# Patient Record
Sex: FEMALE | Race: WHITE | NOT HISPANIC OR LATINO | Employment: UNEMPLOYED | URBAN - METROPOLITAN AREA
[De-identification: names, ages, dates, MRNs, and addresses within clinical notes are randomized per-mention and may not be internally consistent; named-entity substitution may affect disease eponyms.]

---

## 2017-06-04 PROBLEM — D64.9 ANEMIA OF ACUTE INFECTION: Status: ACTIVE | Noted: 2017-06-04

## 2017-06-04 PROBLEM — J18.9 PNEUMONIA OF RIGHT LOWER LOBE DUE TO INFECTIOUS ORGANISM: Status: ACTIVE | Noted: 2017-06-04

## 2017-06-04 PROBLEM — F17.200 TOBACCO USE DISORDER: Status: ACTIVE | Noted: 2017-06-04

## 2017-06-04 PROBLEM — F41.9 ANXIETY: Status: ACTIVE | Noted: 2017-06-04

## 2017-06-04 PROBLEM — R93.89 ABNORMAL CT OF THE CHEST: Status: ACTIVE | Noted: 2017-06-04

## 2017-06-04 PROBLEM — R00.1 BRADYCARDIA: Status: ACTIVE | Noted: 2017-06-04

## 2017-06-04 PROBLEM — I10 ESSENTIAL (PRIMARY) HYPERTENSION: Status: ACTIVE | Noted: 2017-06-04

## 2017-06-04 PROBLEM — J20.9 ACUTE BRONCHITIS: Status: ACTIVE | Noted: 2017-06-04

## 2018-07-17 ENCOUNTER — ANESTHESIA EVENT (OUTPATIENT)
Dept: SURGERY | Facility: HOSPITAL | Age: 53
End: 2018-07-17
Payer: MEDICAID

## 2018-07-17 ENCOUNTER — OFFICE VISIT (OUTPATIENT)
Dept: ORTHOPEDICS | Facility: CLINIC | Age: 53
End: 2018-07-17
Payer: MEDICAID

## 2018-07-17 VITALS
SYSTOLIC BLOOD PRESSURE: 110 MMHG | BODY MASS INDEX: 21.34 KG/M2 | DIASTOLIC BLOOD PRESSURE: 79 MMHG | HEIGHT: 64 IN | WEIGHT: 125 LBS | HEART RATE: 82 BPM

## 2018-07-17 DIAGNOSIS — F17.210 CIGARETTE NICOTINE DEPENDENCE WITHOUT COMPLICATION: ICD-10-CM

## 2018-07-17 DIAGNOSIS — S52.501A CLOSED FRACTURE OF DISTAL END OF RIGHT RADIUS, UNSPECIFIED FRACTURE MORPHOLOGY, INITIAL ENCOUNTER: ICD-10-CM

## 2018-07-17 PROBLEM — F17.200 TOBACCO USE DISORDER: Status: RESOLVED | Noted: 2017-06-04 | Resolved: 2018-07-17

## 2018-07-17 PROCEDURE — 99204 OFFICE O/P NEW MOD 45 MIN: CPT | Mod: S$PBB,,, | Performed by: ORTHOPAEDIC SURGERY

## 2018-07-17 PROCEDURE — 99999 PR PBB SHADOW E&M-NEW PATIENT-LVL III: CPT | Mod: PBBFAC,,, | Performed by: ORTHOPAEDIC SURGERY

## 2018-07-17 PROCEDURE — 99203 OFFICE O/P NEW LOW 30 MIN: CPT | Mod: PBBFAC,PN | Performed by: ORTHOPAEDIC SURGERY

## 2018-07-17 RX ORDER — OXYCODONE AND ACETAMINOPHEN 10; 325 MG/1; MG/1
1 TABLET ORAL EVERY 4 HOURS PRN
Qty: 6 TABLET | Refills: 0 | Status: ON HOLD | OUTPATIENT
Start: 2018-07-17 | End: 2018-07-18 | Stop reason: HOSPADM

## 2018-07-17 RX ORDER — RIFAXIMIN 550 MG/1
550 TABLET ORAL 2 TIMES DAILY
Refills: 0 | COMMUNITY
Start: 2018-06-06 | End: 2020-08-06

## 2018-07-17 RX ORDER — MUPIROCIN 20 MG/G
OINTMENT TOPICAL
Status: CANCELLED | OUTPATIENT
Start: 2018-07-17

## 2018-07-17 RX ORDER — SODIUM CHLORIDE 9 MG/ML
INJECTION, SOLUTION INTRAVENOUS CONTINUOUS
Status: CANCELLED | OUTPATIENT
Start: 2018-07-17

## 2018-07-17 NOTE — LETTER
July 17, 2018      Maxine Pierce MD  [address] [facility] [address]           [facility]  [address] [facility] [address] [place] [address]  Phone: [phone]          Patient: Becki Flood   MR Number: [id number]   YOB: 1965   Date of Visit: 7/17/2018       Dear Dr. Maxine Pierce:    Thank you for referring Becki Flood to me for evaluation. Attached you will find relevant portions of my assessment and plan of care.    If you have questions, please do not hesitate to call me. I look forward to following Becki Flood along with you.    Sincerely,    Asaf Ritchie MD    Enclosure  CC:  No Recipients    If you would like to receive this communication electronically, please contact [email or web address] or [phone] to request more information on [employer] access.    For providers and/or their staff who would like to refer a patient to [facility], please contact us through our one-stop-shop provider referral line, Nicola Coronel, at [phone].    If you feel you have received this communication in error or would no longer like to receive these types of communications, please e-mail [email or web address]

## 2018-07-17 NOTE — PROGRESS NOTES
"HPI: Becki Flood is a 52 y.o. female who complains of right wrist pain. She injured the wrist when slipped and fell yesterday. Pt was transferred from [place] for Orthopedicconsult for distal radius fracture. Reports right wrist pain as sharp/aching, rated 7/10 at this time. Denies numbness, weakness. Closed reduction and splinting was performed in the ER.     Social History     Occupational History    Not on file.     Social History Main Topics    Smoking status: Current Every Day Smoker     Packs/day: 0.50     Years: 10.00    Smokeless tobacco: Never Used    Alcohol use No    Drug use: No    Sexual activity: Yes     Partners: Male        PAST MEDICAL/SURGICAL/FAMILY/SOCIAL/ HISTORY: REVIEWED    ALLERGIES/MEDICATIONS: REVIEWED       Review of Systems:     Constitution: Negative.   HEENT: Negative.   Eyes: Negative.   Cardiovascular: Negative.   Respiratory: Negative.   Endocrine: Negative.   Hematologic/Lymphatic: Negative.   Skin: Negative.   Musculoskeletal: Positive for right wrist pain.   Gastrointestinal: Negative.   Genitourinary: Negative.   Neurological: Negative.   Psychiatric/Behavioral: Negative.   Allergic/Immunologic: Negative.       PHYSICAL EXAM:  Vitals:    07/17/18 1153   BP: 110/79   Pulse: 82     Ht Readings from Last 1 Encounters:   07/17/18 5' 4" (1.626 m)     Wt Readings from Last 1 Encounters:   07/17/18 56.7 kg (125 lb)       GENERAL: Well developed, well nourished, no acute distress.  SKIN: Skin is intact. No atrophy, abrasions or lesions are noted.   Neurological: Normal mental status. Appropriate and conversant. Alert and oriented x 3.      Right upper extremity:  2+ radial pulse.  Capillary refill < 3 seconds.  Limited exam due to pain and splint. Splint is Clean, dry, intact.  Sensation to light touch intact radial, ulnar, median nerves.  Moderate swelling of the fingers. She is able to move the fingers. EPL is intact.  No lymphadenopathy, no masses or tumors palpated.  "     XRAYS:   3 views of right wrist pre and post reduction  reviewed today reveal comminuted distal radius fracture with some improvement in alignment post reduction.       ASSESSMENT:         Encounter Diagnoses   Name Primary?    Cigarette nicotine dependence without complication     Closed fracture of distal end of right radius, unspecified fracture morphology, initial encounter       PLAN:  I spent 15 minutes in consulation with the patient today. More than half the time was spent counseling the patient on her condition and the options for operative versus non-operative care.  Plan for ORIF versus ex fix right distal radius fracture. I have explained the procedure, including indications, risks, and alternatives.  Becki Remigio gave informed consent and is medically ready for surgery. TO OR tomorrow 7/18/18.

## 2018-07-18 ENCOUNTER — ANESTHESIA (OUTPATIENT)
Dept: SURGERY | Facility: HOSPITAL | Age: 53
End: 2018-07-18
Payer: MEDICAID

## 2018-07-18 ENCOUNTER — HOSPITAL ENCOUNTER (OUTPATIENT)
Facility: HOSPITAL | Age: 53
Discharge: HOME | End: 2018-07-18
Attending: ORTHOPAEDIC SURGERY | Admitting: ORTHOPAEDIC SURGERY
Payer: MEDICAID

## 2018-07-18 ENCOUNTER — HOSPITAL ENCOUNTER (OUTPATIENT)
Dept: RADIOLOGY | Facility: HOSPITAL | Age: 53
Discharge: HOME | End: 2018-07-18
Attending: ORTHOPAEDIC SURGERY | Admitting: ORTHOPAEDIC SURGERY
Payer: MEDICAID

## 2018-07-18 VITALS
BODY MASS INDEX: 20.49 KG/M2 | DIASTOLIC BLOOD PRESSURE: 72 MMHG | OXYGEN SATURATION: 96 % | WEIGHT: 120 LBS | RESPIRATION RATE: 16 BRPM | HEIGHT: 64 IN | HEART RATE: 78 BPM | TEMPERATURE: 98 F | SYSTOLIC BLOOD PRESSURE: 160 MMHG

## 2018-07-18 DIAGNOSIS — F17.210 CIGARETTE NICOTINE DEPENDENCE WITHOUT COMPLICATION: ICD-10-CM

## 2018-07-18 DIAGNOSIS — S62.109A WRIST FRACTURE: ICD-10-CM

## 2018-07-18 DIAGNOSIS — S52.501A CLOSED FRACTURE OF DISTAL END OF RIGHT RADIUS, UNSPECIFIED FRACTURE MORPHOLOGY, INITIAL ENCOUNTER: Primary | ICD-10-CM

## 2018-07-18 PROCEDURE — 25000003 PHARM REV CODE 250: Mod: PO | Performed by: ORTHOPAEDIC SURGERY

## 2018-07-18 PROCEDURE — 37000009 HC ANESTHESIA EA ADD 15 MINS: Mod: PO | Performed by: ORTHOPAEDIC SURGERY

## 2018-07-18 PROCEDURE — 63600175 PHARM REV CODE 636 W HCPCS: Mod: PO | Performed by: ORTHOPAEDIC SURGERY

## 2018-07-18 PROCEDURE — D9220A PRA ANESTHESIA: Mod: ANES,,, | Performed by: ANESTHESIOLOGY

## 2018-07-18 PROCEDURE — D9220A PRA ANESTHESIA: Mod: CRNA,,, | Performed by: NURSE ANESTHETIST, CERTIFIED REGISTERED

## 2018-07-18 PROCEDURE — 63600175 PHARM REV CODE 636 W HCPCS: Mod: PO | Performed by: NURSE ANESTHETIST, CERTIFIED REGISTERED

## 2018-07-18 PROCEDURE — 25607 OPTX DST RD XARTC FX/EPI SEP: CPT | Mod: RT,,, | Performed by: ORTHOPAEDIC SURGERY

## 2018-07-18 PROCEDURE — 01830 ANES ARTHR/NDSC WRST/HND NOS: CPT | Mod: PO | Performed by: ORTHOPAEDIC SURGERY

## 2018-07-18 PROCEDURE — 71000033 HC RECOVERY, INTIAL HOUR: Mod: PO | Performed by: ORTHOPAEDIC SURGERY

## 2018-07-18 PROCEDURE — 37000008 HC ANESTHESIA 1ST 15 MINUTES: Mod: PO | Performed by: ORTHOPAEDIC SURGERY

## 2018-07-18 PROCEDURE — 25000003 PHARM REV CODE 250: Mod: PO | Performed by: ANESTHESIOLOGY

## 2018-07-18 PROCEDURE — 76000 FLUOROSCOPY <1 HR PHYS/QHP: CPT | Mod: TC,59,PO

## 2018-07-18 PROCEDURE — 27201423 OPTIME MED/SURG SUP & DEVICES STERILE SUPPLY: Mod: PO | Performed by: ORTHOPAEDIC SURGERY

## 2018-07-18 PROCEDURE — 36000711: Mod: PO | Performed by: ORTHOPAEDIC SURGERY

## 2018-07-18 PROCEDURE — 63600175 PHARM REV CODE 636 W HCPCS: Mod: PO | Performed by: ANESTHESIOLOGY

## 2018-07-18 PROCEDURE — 25000003 PHARM REV CODE 250: Mod: PO | Performed by: NURSE ANESTHETIST, CERTIFIED REGISTERED

## 2018-07-18 PROCEDURE — 36000710: Mod: PO | Performed by: ORTHOPAEDIC SURGERY

## 2018-07-18 PROCEDURE — C1713 ANCHOR/SCREW BN/BN,TIS/BN: HCPCS | Mod: PO | Performed by: ORTHOPAEDIC SURGERY

## 2018-07-18 DEVICE — IMPLANTABLE DEVICE: Type: IMPLANTABLE DEVICE | Site: WRIST | Status: FUNCTIONAL

## 2018-07-18 RX ORDER — DEXAMETHASONE SODIUM PHOSPHATE 4 MG/ML
8 INJECTION, SOLUTION INTRA-ARTICULAR; INTRALESIONAL; INTRAMUSCULAR; INTRAVENOUS; SOFT TISSUE
Status: COMPLETED | OUTPATIENT
Start: 2018-07-18 | End: 2018-07-18

## 2018-07-18 RX ORDER — MIDAZOLAM HYDROCHLORIDE 1 MG/ML
INJECTION, SOLUTION INTRAMUSCULAR; INTRAVENOUS
Status: DISCONTINUED | OUTPATIENT
Start: 2018-07-18 | End: 2018-07-18

## 2018-07-18 RX ORDER — MUPIROCIN 20 MG/G
OINTMENT TOPICAL
Status: DISCONTINUED | OUTPATIENT
Start: 2018-07-18 | End: 2018-07-18 | Stop reason: HOSPADM

## 2018-07-18 RX ORDER — OXYCODONE HYDROCHLORIDE 5 MG/1
5 TABLET ORAL
Status: DISCONTINUED | OUTPATIENT
Start: 2018-07-18 | End: 2018-07-18 | Stop reason: HOSPADM

## 2018-07-18 RX ORDER — ONDANSETRON 2 MG/ML
INJECTION INTRAMUSCULAR; INTRAVENOUS
Status: DISCONTINUED | OUTPATIENT
Start: 2018-07-18 | End: 2018-07-18

## 2018-07-18 RX ORDER — PROPOFOL 10 MG/ML
VIAL (ML) INTRAVENOUS
Status: DISCONTINUED | OUTPATIENT
Start: 2018-07-18 | End: 2018-07-18

## 2018-07-18 RX ORDER — LIDOCAINE HYDROCHLORIDE 10 MG/ML
1 INJECTION, SOLUTION EPIDURAL; INFILTRATION; INTRACAUDAL; PERINEURAL ONCE
Status: COMPLETED | OUTPATIENT
Start: 2018-07-18 | End: 2018-07-18

## 2018-07-18 RX ORDER — KETOROLAC TROMETHAMINE 30 MG/ML
INJECTION, SOLUTION INTRAMUSCULAR; INTRAVENOUS
Status: DISCONTINUED | OUTPATIENT
Start: 2018-07-18 | End: 2018-07-18

## 2018-07-18 RX ORDER — NEOSTIGMINE METHYLSULFATE 1 MG/ML
INJECTION, SOLUTION INTRAVENOUS
Status: DISCONTINUED | OUTPATIENT
Start: 2018-07-18 | End: 2018-07-18

## 2018-07-18 RX ORDER — DIPHENHYDRAMINE HYDROCHLORIDE 50 MG/ML
25 INJECTION INTRAMUSCULAR; INTRAVENOUS EVERY 6 HOURS PRN
Status: DISCONTINUED | OUTPATIENT
Start: 2018-07-18 | End: 2018-07-18 | Stop reason: HOSPADM

## 2018-07-18 RX ORDER — CEFAZOLIN SODIUM 1 G/3ML
2 INJECTION, POWDER, FOR SOLUTION INTRAMUSCULAR; INTRAVENOUS
Status: COMPLETED | OUTPATIENT
Start: 2018-07-18 | End: 2018-07-18

## 2018-07-18 RX ORDER — LIDOCAINE HCL/PF 100 MG/5ML
SYRINGE (ML) INTRAVENOUS
Status: DISCONTINUED | OUTPATIENT
Start: 2018-07-18 | End: 2018-07-18

## 2018-07-18 RX ORDER — SODIUM CHLORIDE, SODIUM LACTATE, POTASSIUM CHLORIDE, CALCIUM CHLORIDE 600; 310; 30; 20 MG/100ML; MG/100ML; MG/100ML; MG/100ML
INJECTION, SOLUTION INTRAVENOUS CONTINUOUS
Status: DISCONTINUED | OUTPATIENT
Start: 2018-07-18 | End: 2018-07-18 | Stop reason: HOSPADM

## 2018-07-18 RX ORDER — GLYCOPYRROLATE 0.2 MG/ML
INJECTION INTRAMUSCULAR; INTRAVENOUS
Status: DISCONTINUED | OUTPATIENT
Start: 2018-07-18 | End: 2018-07-18

## 2018-07-18 RX ORDER — HYDROMORPHONE HYDROCHLORIDE 2 MG/ML
0.2 INJECTION, SOLUTION INTRAMUSCULAR; INTRAVENOUS; SUBCUTANEOUS EVERY 5 MIN PRN
Status: DISCONTINUED | OUTPATIENT
Start: 2018-07-18 | End: 2018-07-18 | Stop reason: HOSPADM

## 2018-07-18 RX ORDER — SUCCINYLCHOLINE CHLORIDE 20 MG/ML
INJECTION INTRAMUSCULAR; INTRAVENOUS
Status: DISCONTINUED | OUTPATIENT
Start: 2018-07-18 | End: 2018-07-18

## 2018-07-18 RX ORDER — ONDANSETRON 4 MG/1
8 TABLET, ORALLY DISINTEGRATING ORAL EVERY 8 HOURS PRN
Qty: 20 TABLET | Refills: 1 | Status: SHIPPED | OUTPATIENT
Start: 2018-07-18 | End: 2020-08-06

## 2018-07-18 RX ORDER — MEPERIDINE HYDROCHLORIDE 50 MG/ML
12.5 INJECTION INTRAMUSCULAR; INTRAVENOUS; SUBCUTANEOUS ONCE AS NEEDED
Status: DISCONTINUED | OUTPATIENT
Start: 2018-07-18 | End: 2018-07-18 | Stop reason: HOSPADM

## 2018-07-18 RX ORDER — ROCURONIUM BROMIDE 10 MG/ML
INJECTION, SOLUTION INTRAVENOUS
Status: DISCONTINUED | OUTPATIENT
Start: 2018-07-18 | End: 2018-07-18

## 2018-07-18 RX ORDER — SODIUM CHLORIDE 9 MG/ML
INJECTION, SOLUTION INTRAVENOUS CONTINUOUS
Status: DISCONTINUED | OUTPATIENT
Start: 2018-07-18 | End: 2018-07-18 | Stop reason: HOSPADM

## 2018-07-18 RX ORDER — OXYCODONE AND ACETAMINOPHEN 7.5; 325 MG/1; MG/1
1 TABLET ORAL EVERY 4 HOURS PRN
Qty: 32 TABLET | Refills: 0 | Status: SHIPPED | OUTPATIENT
Start: 2018-07-18 | End: 2020-08-06

## 2018-07-18 RX ORDER — KETAMINE HYDROCHLORIDE 100 MG/ML
INJECTION, SOLUTION INTRAMUSCULAR; INTRAVENOUS
Status: DISCONTINUED | OUTPATIENT
Start: 2018-07-18 | End: 2018-07-18

## 2018-07-18 RX ORDER — FENTANYL CITRATE 50 UG/ML
INJECTION, SOLUTION INTRAMUSCULAR; INTRAVENOUS
Status: DISCONTINUED | OUTPATIENT
Start: 2018-07-18 | End: 2018-07-18

## 2018-07-18 RX ADMIN — KETOROLAC TROMETHAMINE 30 MG: 30 INJECTION, SOLUTION INTRAMUSCULAR; INTRAVENOUS at 11:07

## 2018-07-18 RX ADMIN — ROCURONIUM BROMIDE 5 MG: 10 INJECTION, SOLUTION INTRAVENOUS at 10:07

## 2018-07-18 RX ADMIN — SODIUM CHLORIDE, SODIUM LACTATE, POTASSIUM CHLORIDE, AND CALCIUM CHLORIDE: 600; 310; 30; 20 INJECTION, SOLUTION INTRAVENOUS at 10:07

## 2018-07-18 RX ADMIN — MIDAZOLAM HYDROCHLORIDE 1 MG: 1 INJECTION, SOLUTION INTRAMUSCULAR; INTRAVENOUS at 10:07

## 2018-07-18 RX ADMIN — ONDANSETRON 4 MG: 2 INJECTION, SOLUTION INTRAMUSCULAR; INTRAVENOUS at 11:07

## 2018-07-18 RX ADMIN — FENTANYL CITRATE 100 MCG: 50 INJECTION, SOLUTION INTRAMUSCULAR; INTRAVENOUS at 10:07

## 2018-07-18 RX ADMIN — ROCURONIUM BROMIDE 25 MG: 10 INJECTION, SOLUTION INTRAVENOUS at 10:07

## 2018-07-18 RX ADMIN — PROPOFOL 150 MG: 10 INJECTION, EMULSION INTRAVENOUS at 10:07

## 2018-07-18 RX ADMIN — DEXAMETHASONE SODIUM PHOSPHATE 8 MG: 4 INJECTION, SOLUTION INTRAMUSCULAR; INTRAVENOUS at 10:07

## 2018-07-18 RX ADMIN — CEFAZOLIN 2 G: 1 INJECTION, POWDER, FOR SOLUTION INTRAVENOUS at 10:07

## 2018-07-18 RX ADMIN — KETAMINE HYDROCHLORIDE 25 MG: 100 INJECTION, SOLUTION, CONCENTRATE INTRAMUSCULAR; INTRAVENOUS at 10:07

## 2018-07-18 RX ADMIN — MUPIROCIN: 20 OINTMENT TOPICAL at 10:07

## 2018-07-18 RX ADMIN — GLYCOPYRROLATE 0.6 MG: 0.2 INJECTION, SOLUTION INTRAMUSCULAR; INTRAVENOUS at 11:07

## 2018-07-18 RX ADMIN — GLYCOPYRROLATE 0.2 MG: 0.2 INJECTION, SOLUTION INTRAMUSCULAR; INTRAVENOUS at 11:07

## 2018-07-18 RX ADMIN — LIDOCAINE HYDROCHLORIDE 100 MG: 20 INJECTION PARENTERAL at 10:07

## 2018-07-18 RX ADMIN — LIDOCAINE HYDROCHLORIDE 1 MG: 10 INJECTION, SOLUTION EPIDURAL; INFILTRATION; INTRACAUDAL; PERINEURAL at 10:07

## 2018-07-18 RX ADMIN — SUCCINYLCHOLINE CHLORIDE 100 MG: 20 INJECTION, SOLUTION INTRAMUSCULAR; INTRAVENOUS at 10:07

## 2018-07-18 RX ADMIN — NEOSTIGMINE METHYLSULFATE 4 MG: 1 INJECTION INTRAVENOUS at 11:07

## 2018-07-18 NOTE — PLAN OF CARE
"VSS. Patient easily falling asleep. Dr. Franco aware of patient status and diminished breath sounds RLL. Patient's son in route to sign anesthesia consent. Patient denies any drug use, percocet or hydrocodone use during the night. Patient states she was unable to sleep last night and has been "throwing up" all night. Questions answered to patient satisfaction. Patient ready for procedure. Surgery consent signed previously 7/17/18.    "

## 2018-07-18 NOTE — H&P
"HPI: Becki Flood is a 52 y.o. female who complains of right wrist pain. She injured the wrist when slipped and fell yesterday. Pt was transferred from [place] for Orthopedicconsult for distal radius fracture. Reports right wrist pain as sharp/aching, rated 7/10 at this time. Denies numbness, weakness. Closed reduction and splinting was performed in the ER.      Social History          Occupational History    Not on file.            Social History Main Topics    Smoking status: Current Every Day Smoker       Packs/day: 0.50       Years: 10.00    Smokeless tobacco: Never Used    Alcohol use No    Drug use: No    Sexual activity: Yes       Partners: Male         PAST MEDICAL/SURGICAL/FAMILY/SOCIAL/ HISTORY: REVIEWED    ALLERGIES/MEDICATIONS: REVIEWED         Review of Systems:     Constitution: Negative.   HEENT: Negative.   Eyes: Negative.   Cardiovascular: Negative.   Respiratory: Negative.   Endocrine: Negative.   Hematologic/Lymphatic: Negative.   Skin: Negative.   Musculoskeletal: Positive for right wrist pain.   Gastrointestinal: Negative.   Genitourinary: Negative.   Neurological: Negative.   Psychiatric/Behavioral: Negative.   Allergic/Immunologic: Negative.         PHYSICAL EXAM:      Vitals:     07/17/18 1153   BP: 110/79   Pulse: 82          Ht Readings from Last 1 Encounters:   07/17/18 5' 4" (1.626 m)          Wt Readings from Last 1 Encounters:   07/17/18 56.7 kg (125 lb)        GENERAL: Well developed, well nourished, no acute distress.  SKIN: Skin is intact. No atrophy, abrasions or lesions are noted.   Neurological: Normal mental status. Appropriate and conversant. Alert and oriented x 3.        Right upper extremity:  2+ radial pulse.  Capillary refill < 3 seconds.  Limited exam due to pain and splint. Splint is Clean, dry, intact.  Sensation to light touch intact radial, ulnar, median nerves.  Moderate swelling of the fingers. She is able to move the fingers. EPL is intact.  No " lymphadenopathy, no masses or tumors palpated.       XRAYS:   3 views of right wrist pre and post reduction  reviewed today reveal comminuted distal radius fracture with some improvement in alignment post reduction.         ASSESSMENT:            Encounter Diagnoses   Name Primary?    Cigarette nicotine dependence without complication      Closed fracture of distal end of right radius, unspecified fracture morphology, initial encounter        PLAN:  I spent 15 minutes in consulation with the patient today. More than half the time was spent counseling the patient on her condition and the options for operative versus non-operative care.  Plan for ORIF versus ex fix right distal radius fracture. I have explained the procedure, including indications, risks, and alternatives.  Becki Dietzpson gave informed consent and is medically ready for surgery. TO OR tomorrow 7/18/18.

## 2018-07-18 NOTE — TRANSFER OF CARE
"Anesthesia Transfer of Care Note    Patient: Becki Flood    Procedure(s) Performed: Procedure(s) (LRB):  ORIF, FRACTURE, RADIUS, DISTAL (Right)  APPLICATION, EXTERNAL FIXATION DEVICE, SMALL, HAND OR WRIST (Right)    Patient location: PACU    Anesthesia Type: general    Transport from OR: Transported from OR on room air with adequate spontaneous ventilation    Post pain: adequate analgesia    Post assessment: no apparent anesthetic complications    Post vital signs: stable    Level of consciousness: awake    Nausea/Vomiting: no nausea/vomiting    Complications: none    Transfer of care protocol was followed      Last vitals:   Visit Vitals  Ht 5' 4" (1.626 m)   Wt 54.4 kg (120 lb)   Breastfeeding? No   BMI 20.60 kg/m²     "

## 2018-07-18 NOTE — ANESTHESIA PREPROCEDURE EVALUATION
07/18/2018  Becki Flood is a 52 y.o., female.    Anesthesia Evaluation    I have reviewed the Patient Summary Reports.    I have reviewed the Nursing Notes.   I have reviewed the Medications.     Review of Systems  Anesthesia Hx:  No problems with previous Anesthesia    Social:  Smoker Smoking Status: Current Every Day Smoker - 5 pack years  Smokeless Tobacco Status: Never Used  Alcohol use: No  Drug use: No       Cardiovascular:   Hypertension    Musculoskeletal:  Bone Disorders: Fracture , location of radius.     Neurological:   Seizures        Physical Exam  General:  Well nourished    Airway/Jaw/Neck:  Airway Findings: Mouth Opening: Normal Tongue: Normal  General Airway Assessment: Adult, Good  Mallampati: II  Improves to II with phonation.  TM Distance: 4-6 cm      Dental:  Dental Findings: In tact   Chest/Lungs:  Chest/Lungs Findings: Clear to auscultation, Normal Respiratory Rate     Heart/Vascular:  Heart Findings: Rate: Normal  Rhythm: Regular Rhythm  Sounds: Normal  Heart murmur: negative       Mental Status:  Mental Status Findings:  Cooperative, Alert and Oriented, Anxious         Anesthesia Plan  Type of Anesthesia, risks & benefits discussed:  Anesthesia Type:  general  Patient's Preference:   Intra-op Monitoring Plan: standard ASA monitors  Intra-op Monitoring Plan Comments:   Post Op Pain Control Plan:   Post Op Pain Control Plan Comments:   Induction:   IV  Beta Blocker:  Patient is not currently on a Beta-Blocker (No further documentation required).       Informed Consent: Patient understands risks and agrees with Anesthesia plan.  Questions answered. Anesthesia consent signed with patient.  ASA Score: 3     Day of Surgery Review of History & Physical: I have interviewed and examined the patient. I have reviewed the patient's H&P dated:  There are no significant changes.          Ready  For Surgery From Anesthesia Perspective.

## 2018-07-18 NOTE — OP NOTE
OPERATIVE NOTE    DATE: 7/18/2018  TIME: 11:40 AM     PATIENT NAME: Becki Flood    PRE-OPERATIVE DIAGNOSIS: Right distal radius fracture    POST-OPERATIVE DIAGNOSIS:  Right distal radius fracture    PROCEDURE: Open reduction internal fixation of Right distal radius fracture    SURGEON: Asaf Ritchie MD     ANESTHESIA TYPE: GETA    SPECIMENS SENT: None    COMPLICATIONS: None    BLOOD LOSS: < 5 cc    ASSISTANT: VERONIQUE Andrea DPM    Procedure in detail: After appropriate informed consent was obtained the patient was taken to the OR and placed in the supine position with the right  hand on the operating table. The right upper extremity was prepped and draped in the usual sterile fashion. The tourniquet was raised to 250 mmHg after esmarch exsanguination of the limb. Approximately, 6-cm incision was made longitudinally using a #15 blade overlying the flexor eugenie radialis tendon. The tendon was retracted radially and Litler scissors were used to incise the floor of the tendon sheath in-line with the skin incision. Blunt wiedlanders were place to retract the soft tissue and protect the median nerve. The fracture site was identified and cleared of osseous and soft tissue debris using curettes and ling wronger. The fracture fragments were identified, the joint was visualized and key elevator was used to reduce the volar fracture fragment. 0.045 k-wires were used to provisionally pin the fracture . There was good reduction of the fracture under direct visualization and on AP/Lateral/Oblique views of the wrist under fluoroscopy. Synthes titanium distal radius volar locking plate was used for open reduction internal fixation. One non-locking screw was placed proximally in the shaft of the radius and one distally. The remainder of the distal screws placed were locking screws. Two additional locking screws were placed proximally in the shaft of the radius. The patient's bone was osteoporotic.   AP/Lateral/Oblique views of the wrist under fluoroscopy showed good reduction. The incision was irrigated with normal saline. The tourniquet was let down, adequate hemostasis was achieved using bovie cautery. The deep layer was re-approximated using 0-monocyrl in an interrupted fashion. The subcutaneous layer was re-approximated using 2.0-monocyrl and 3.0 monocryl in an interrupted fashion. The skin was re-approximated using 3.0 nylon in a running fashion. Sterile dressing using xeroform, bacitracin, 4x4s, cast padding, sugar tong splint and ace wrap were placed. Patient tolerated the procedure well without complications.  I was present and scrubbed for the entire case.

## 2018-07-18 NOTE — DISCHARGE INSTRUCTIONS

## 2018-07-18 NOTE — BRIEF OP NOTE
OPERATIVE NOTE    DATE: 7/18/2018  TIME: 11:40 AM     PATIENT NAME: Becki Flood    PRE-OPERATIVE DIAGNOSIS: Right distal radius fracture    POST-OPERATIVE DIAGNOSIS:  Right distal radius fracture    PROCEDURE: Open reduction internal fixation of Right distal radius fracture    SURGEON: Asaf Ritchie MD     ANESTHESIA TYPE: GETA    SPECIMENS SENT: None    COMPLICATIONS: None    BLOOD LOSS: < 5 cc    ASSISTANT: VERONIQUE Andrea DPM

## 2018-07-19 NOTE — DISCHARGE SUMMARY
[facility]  Discharge Note  Short Stay    Admit Date: 7/18/2018    Discharge Date and Time: 7/18/2018 12:54 PM     Attending Physician: Asaf Ritchie MD     Discharge Provider: Asaf Ritchie    Diagnoses:  Active Hospital Problems    Diagnosis  POA    *Closed fracture of right distal radius [S52.501A]  Yes      Resolved Hospital Problems    Diagnosis Date Resolved POA   No resolved problems to display.       Discharged Condition: good    Hospital Course: Patient was admitted for an outpatient procedure and tolerated the procedure well with no complications.    Final Diagnoses: Same as principal problem.    Disposition: Home or Self Care    Follow up/Patient Instructions:    Medications:  Reconciled Home Medications:      Medication List      START taking these medications    ondansetron 4 MG Tbdl  Commonly known as:  ZOFRAN-ODT  Take 2 tablets (8 mg total) by mouth every 8 (eight) hours as needed.     oxyCODONE-acetaminophen 7.5-325 mg per tablet  Commonly known as:  PERCOCET  Take 1 tablet by mouth every 4 (four) hours as needed for Pain.  Replaces:  oxyCODONE-acetaminophen  mg per tablet        CONTINUE taking these medications    ALPRAZolam 1 MG tablet  Commonly known as:  XANAX  Take 1 mg by mouth 3 (three) times daily as needed for Anxiety.     amLODIPine 5 MG tablet  Commonly known as:  NORVASC  Take 5 mg by mouth once daily.     divalproex  MG 24 hr tablet  Commonly known as:  DEPAKOTE ER  Take 500 mg by mouth 2 (two) times daily.     HYDROcodone-acetaminophen 5-325 mg per tablet  Commonly known as:  NORCO  Take 1-2 tablets by mouth every 4 (four) hours as needed for Pain.     pantoprazole 40 MG tablet  Commonly known as:  PROTONIX  Take 40 mg by mouth once daily.     sertraline 50 MG tablet  Commonly known as:  ZOLOFT  Take 50 mg by mouth once daily.     XIFAXAN 550 mg Tab  Generic drug:  rifAXIMin  Take 550 mg by mouth 2 (two) times daily.        STOP taking these medications     meloxicam 15 MG tablet  Commonly known as:  MOBIC     oxyCODONE-acetaminophen  mg per tablet  Commonly known as:  PERCOCET  Replaced by:  oxyCODONE-acetaminophen 7.5-325 mg per tablet            Discharge Procedure Orders  Diet general     Call MD for:  temperature >100.4     Call MD for:  persistent nausea and vomiting     Call MD for:  severe uncontrolled pain     Call MD for:  difficulty breathing, headache or visual disturbances     Call MD for:  redness, tenderness, or signs of infection (pain, swelling, redness, odor or green/yellow discharge around incision site)     Call MD for:  hives     Call MD for:  persistent dizziness or light-headedness     Call MD for:  extreme fatigue     Keep surgical extremity elevated     No driving, operating heavy equipment or signing legal documents while taking pain medication     Leave dressing on - Keep it clean, dry, and intact until clinic visit     Non weight bearing       Follow-up Information     Asaf Ritchie MD In 2 weeks.    Specialty:  Orthopedic Surgery  Contact information:  [address] [place] [address] [phone]                   Discharge Procedure Orders (must include Diet, Follow-up, Activity):    Discharge Procedure Orders (must include Diet, Follow-up, Activity)  Diet general     Call MD for:  temperature >100.4     Call MD for:  persistent nausea and vomiting     Call MD for:  severe uncontrolled pain     Call MD for:  difficulty breathing, headache or visual disturbances     Call MD for:  redness, tenderness, or signs of infection (pain, swelling, redness, odor or green/yellow discharge around incision site)     Call MD for:  hives     Call MD for:  persistent dizziness or light-headedness     Call MD for:  extreme fatigue     Keep surgical extremity elevated     No driving, operating heavy equipment or signing legal documents while taking pain medication     Leave dressing on - Keep it clean, dry, and intact until clinic visit      Non weight bearing

## 2018-07-19 NOTE — ANESTHESIA POSTPROCEDURE EVALUATION
"Anesthesia Post Evaluation    Patient: Becki Flood    Procedure(s) Performed: Procedure(s) (LRB):  ORIF, FRACTURE, RADIUS, DISTAL (Right)    Final Anesthesia Type: general  Patient location during evaluation: PACU  Patient participation: Yes- Able to Participate  Level of consciousness: awake and alert and oriented  Post-procedure vital signs: reviewed and stable  Pain management: adequate  Airway patency: patent  PONV status at discharge: No PONV  Anesthetic complications: no      Cardiovascular status: blood pressure returned to baseline  Respiratory status: unassisted, spontaneous ventilation and room air  Hydration status: euvolemic  Follow-up not needed.        Visit Vitals  BP (!) 160/72 (BP Location: Left leg, Patient Position: Lying)   Pulse 78   Temp 36.7 °C (98 °F) (Skin)   Resp 16   Ht 5' 4" (1.626 m)   Wt 54.4 kg (120 lb)   SpO2 96%   Breastfeeding? No   BMI 20.60 kg/m²       Pain/Riccardo Score: Pain Assessment Performed: Yes (7/18/2018 12:46 PM)  Presence of Pain: denies (7/18/2018 12:46 PM)  Riccardo Score: 10 (7/18/2018 12:46 PM)      "

## 2018-07-30 DIAGNOSIS — S52.501A CLOSED FRACTURE OF DISTAL END OF RIGHT RADIUS, UNSPECIFIED FRACTURE MORPHOLOGY, INITIAL ENCOUNTER: Primary | ICD-10-CM

## 2018-07-31 ENCOUNTER — TELEPHONE (OUTPATIENT)
Dept: ORTHOPEDICS | Facility: CLINIC | Age: 53
End: 2018-07-31

## 2018-08-06 ENCOUNTER — TELEPHONE (OUTPATIENT)
Dept: ORTHOPEDICS | Facility: CLINIC | Age: 53
End: 2018-08-06

## 2018-08-06 NOTE — TELEPHONE ENCOUNTER
Attempted to call patient twice and phone went to voicemail both times and it is not set up to take messages

## 2018-08-06 NOTE — TELEPHONE ENCOUNTER
----- Message from Lorin Sandra sent at 8/6/2018 10:15 AM CDT -----  Contact: Son Sinan [phone]  She missed her last post op appt because she was not aware of the time.  So she needs to be reschedule.  She has medicaid, please call her to reschedule.  Thank you!

## 2018-08-06 NOTE — TELEPHONE ENCOUNTER
----- Message from Christen Velasco MA sent at 8/6/2018  9:34 AM CDT -----  Contact: Self  Patient LVM requesting a call back to schedule an appointment to get her splint taken off and have a permanent cast put on.    Call Back# [phone]  Thanks

## 2018-08-07 ENCOUNTER — TELEPHONE (OUTPATIENT)
Dept: ORTHOPEDICS | Facility: CLINIC | Age: 53
End: 2018-08-07

## 2018-08-07 DIAGNOSIS — S62.101A CLOSED FRACTURE OF RIGHT WRIST, INITIAL ENCOUNTER: Primary | ICD-10-CM

## 2018-08-07 RX ORDER — OXYCODONE AND ACETAMINOPHEN 7.5; 325 MG/1; MG/1
1 TABLET ORAL EVERY 4 HOURS PRN
Qty: 32 TABLET | Refills: 0 | Status: CANCELLED | OUTPATIENT
Start: 2018-08-07

## 2018-08-07 NOTE — TELEPHONE ENCOUNTER
Call patient son back to schedule patient follow up post op appointment there was no answer we have called and left numerous messages

## 2018-08-07 NOTE — TELEPHONE ENCOUNTER
----- Message from Elizabeth Turner sent at 8/7/2018  4:27 PM CDT -----  Contact: Eddy Menon // [phone]  Patient's son is calling for a refill on her pain medication .. Please call his cell at [phone]

## 2018-08-07 NOTE — TELEPHONE ENCOUNTER
----- Message from Francisco Velasco sent at 8/7/2018  3:47 PM CDT -----  Contact: Son, Sinan Meonn want to speak with a nurse regarding scheduling appointment this week for wrist fracture please call back at [phone]

## 2018-08-07 NOTE — TELEPHONE ENCOUNTER
Called patient about medication and there was no answer left voicemail stating Dr. Ritchie will not be able to refill any medication until the patient comes for her post op visit

## 2018-08-07 NOTE — TELEPHONE ENCOUNTER
Called patient to schedule her appoinment patient did not answer the phone I attempted to call patient several times and Elizabeth Turner has also called numerous times

## 2018-08-07 NOTE — TELEPHONE ENCOUNTER
----- Message from Verajames Sue sent at 8/7/2018 12:38 PM CDT -----  Contact: pt's Son Sinan  Pt is requesting to speak with nurse in regards to her appt which was missed on 7-31-18  Pt called  several times to reschedule but no availability to schedule for pt,  Pt is also requesting a medication for pain  Please call pt to advise  Call Back   Thanks        ..  [facility] - [address] [address]  Phone: [phone] Fax: [phone]    [facility] [place] [address] [facility] [address] [place] [address]  Phone: [phone] Fax: [phone]

## 2018-08-10 ENCOUNTER — TELEPHONE (OUTPATIENT)
Dept: ORTHOPEDICS | Facility: CLINIC | Age: 53
End: 2018-08-10

## 2018-08-10 NOTE — TELEPHONE ENCOUNTER
----- Message from Roque Martinez sent at 8/10/2018  2:05 PM CDT -----  Type:  Sooner Apoointment Request    Caller is requesting a sooner appointment.  Caller declined first available appointment listed below.  Caller will not accept being placed on the waitlist and is requesting a message be sent to doctor.    Name of Caller:  Daughter in law-  Star When is the first available appointment?  No future appointments  Symptoms:  Best Call Back Number [phone]:  Additional Information:  Patient need to rescheduled missed post op appointment, x-ray with the doctor.

## 2018-08-14 ENCOUNTER — HOSPITAL ENCOUNTER (OUTPATIENT)
Dept: RADIOLOGY | Facility: HOSPITAL | Age: 53
Discharge: HOME | End: 2018-08-14
Attending: ORTHOPAEDIC SURGERY
Payer: MEDICAID

## 2018-08-14 ENCOUNTER — OFFICE VISIT (OUTPATIENT)
Dept: ORTHOPEDICS | Facility: CLINIC | Age: 53
End: 2018-08-14
Payer: MEDICAID

## 2018-08-14 VITALS
HEIGHT: 64 IN | DIASTOLIC BLOOD PRESSURE: 95 MMHG | SYSTOLIC BLOOD PRESSURE: 147 MMHG | WEIGHT: 120 LBS | HEART RATE: 69 BPM | BODY MASS INDEX: 20.49 KG/M2

## 2018-08-14 DIAGNOSIS — S52.501A CLOSED FRACTURE OF DISTAL END OF RIGHT RADIUS, UNSPECIFIED FRACTURE MORPHOLOGY, INITIAL ENCOUNTER: ICD-10-CM

## 2018-08-14 DIAGNOSIS — S52.531D CLOSED COLLES' FRACTURE OF RIGHT RADIUS WITH ROUTINE HEALING: ICD-10-CM

## 2018-08-14 PROCEDURE — 97760 ORTHOTIC MGMT&TRAING 1ST ENC: CPT | Mod: PBBFAC,PN | Performed by: ORTHOPAEDIC SURGERY

## 2018-08-14 PROCEDURE — 99213 OFFICE O/P EST LOW 20 MIN: CPT | Mod: PBBFAC,25,PN | Performed by: ORTHOPAEDIC SURGERY

## 2018-08-14 PROCEDURE — 73110 X-RAY EXAM OF WRIST: CPT | Mod: 26,RT,, | Performed by: RADIOLOGY

## 2018-08-14 PROCEDURE — 73110 X-RAY EXAM OF WRIST: CPT | Mod: TC,PO,RT

## 2018-08-14 PROCEDURE — 99024 POSTOP FOLLOW-UP VISIT: CPT | Mod: ,,, | Performed by: ORTHOPAEDIC SURGERY

## 2018-08-14 PROCEDURE — 99999 PR PBB SHADOW E&M-EST. PATIENT-LVL III: CPT | Mod: PBBFAC,,, | Performed by: ORTHOPAEDIC SURGERY

## 2018-08-14 NOTE — PROGRESS NOTES
Subjective:      Patient ID: Becki Flood is a 52 y.o. female.    Chief Complaint: Post-op Evaluation of the Right Wrist    Pt is here for f/u today s/p orif right distal radius fracture. She has missed her last 2-3 appointments including initial post op visit after surgery.   She rates her pain as 7/10 today.     Social History     Occupational History    Not on file   Tobacco Use    Smoking status: Current Every Day Smoker     Packs/day: 0.50     Years: 23.00     Pack years: 11.50    Smokeless tobacco: Never Used   Substance and Sexual Activity    Alcohol use: No    Drug use: No    Sexual activity: Yes     Partners: Male            Objective:         RUE: <POSTOP> Neurovascularly intact, incision well healed, moderate swelling, sutures are intact.  No signs of infection.    tenderness to palpation at the fracture site. Decreased range of motion of the wrist and elbow.       XRAYS: 3 views of right wrist obtained and reviewed today reveal  Good reduction and alignment. Hardware is intact  . There is some interval progression of healing.     Assessment:         s/p orif right distal radius fracture  Plan:     Sutures were removed today and steri-strips were placed.     We performed a custom orthotic/brace adjustment, fitting and training with the patient today. The patient demonstrated understanding and proper care. This was performed for 15 minutes.  Exos wrist cast brace  was given.       F/u 1 month with xray of the right wrist.

## 2018-09-12 DIAGNOSIS — M25.531 RIGHT WRIST PAIN: Primary | ICD-10-CM

## 2020-08-28 ENCOUNTER — CLINICAL SUPPORT (OUTPATIENT)
Dept: URGENT CARE | Facility: CLINIC | Age: 55
End: 2020-08-28
Payer: MEDICAID

## 2020-08-28 DIAGNOSIS — Z01.818 PRE-OPERATIVE CLEARANCE: ICD-10-CM

## 2020-08-28 PROCEDURE — U0003 INFECTIOUS AGENT DETECTION BY NUCLEIC ACID (DNA OR RNA); SEVERE ACUTE RESPIRATORY SYNDROME CORONAVIRUS 2 (SARS-COV-2) (CORONAVIRUS DISEASE [COVID-19]), AMPLIFIED PROBE TECHNIQUE, MAKING USE OF HIGH THROUGHPUT TECHNOLOGIES AS DESCRIBED BY CMS-2020-01-R: HCPCS

## 2020-08-29 LAB — SARS-COV-2 RNA RESP QL NAA+PROBE: NOT DETECTED

## 2020-09-02 PROBLEM — K82.8 GALLBLADDER SLUDGE: Status: ACTIVE | Noted: 2020-09-02

## 2021-05-04 ENCOUNTER — PATIENT MESSAGE (OUTPATIENT)
Dept: RESEARCH | Facility: HOSPITAL | Age: 56
End: 2021-05-04

## 2023-07-26 ENCOUNTER — OFFICE VISIT (OUTPATIENT)
Dept: ORTHOPEDICS | Facility: CLINIC | Age: 58
End: 2023-07-26

## 2023-07-26 ENCOUNTER — HOSPITAL ENCOUNTER (OUTPATIENT)
Dept: RADIOLOGY | Facility: HOSPITAL | Age: 58
Discharge: HOME | End: 2023-07-26
Attending: ORTHOPAEDIC SURGERY
Payer: MEDICAID

## 2023-07-26 VITALS — HEIGHT: 64 IN | BODY MASS INDEX: 21 KG/M2 | WEIGHT: 123 LBS

## 2023-07-26 DIAGNOSIS — M25.532 LEFT WRIST PAIN: Primary | ICD-10-CM

## 2023-07-26 DIAGNOSIS — S52.532A CLOSED COLLES' FRACTURE OF LEFT RADIUS, INITIAL ENCOUNTER: Primary | ICD-10-CM

## 2023-07-26 DIAGNOSIS — M25.532 LEFT WRIST PAIN: ICD-10-CM

## 2023-07-26 PROCEDURE — 99999 PR PBB SHADOW E&M-EST. PATIENT-LVL III: ICD-10-PCS | Mod: PBBFAC,,, | Performed by: ORTHOPAEDIC SURGERY

## 2023-07-26 PROCEDURE — 99999PBSHW PR PBB SHADOW TECHNICAL ONLY FILED TO HB: ICD-10-PCS | Mod: PBBFAC,,,

## 2023-07-26 PROCEDURE — 29125 PR APPLY FOREARM SPLINT,STATIC: ICD-10-PCS | Mod: S$PBB,LT,, | Performed by: ORTHOPAEDIC SURGERY

## 2023-07-26 PROCEDURE — 73110 XR WRIST COMPLETE 3 VIEWS LEFT: ICD-10-PCS | Mod: 26,LT,, | Performed by: RADIOLOGY

## 2023-07-26 PROCEDURE — 73110 X-RAY EXAM OF WRIST: CPT | Mod: TC,PO,LT

## 2023-07-26 PROCEDURE — 99204 PR OFFICE/OUTPT VISIT, NEW, LEVL IV, 45-59 MIN: ICD-10-PCS | Mod: S$PBB,25,, | Performed by: ORTHOPAEDIC SURGERY

## 2023-07-26 PROCEDURE — 99999PBSHW PR PBB SHADOW TECHNICAL ONLY FILED TO HB: Mod: PBBFAC,,,

## 2023-07-26 PROCEDURE — 73110 X-RAY EXAM OF WRIST: CPT | Mod: 26,LT,, | Performed by: RADIOLOGY

## 2023-07-26 PROCEDURE — 99204 OFFICE O/P NEW MOD 45 MIN: CPT | Mod: S$PBB,25,, | Performed by: ORTHOPAEDIC SURGERY

## 2023-07-26 PROCEDURE — 29125 APPL SHORT ARM SPLINT STATIC: CPT | Mod: S$PBB,LT,, | Performed by: ORTHOPAEDIC SURGERY

## 2023-07-26 PROCEDURE — 99213 OFFICE O/P EST LOW 20 MIN: CPT | Mod: PBBFAC,PN | Performed by: ORTHOPAEDIC SURGERY

## 2023-07-26 PROCEDURE — 29125 APPL SHORT ARM SPLINT STATIC: CPT | Mod: PBBFAC,PN,LT | Performed by: ORTHOPAEDIC SURGERY

## 2023-07-26 PROCEDURE — 99999 PR PBB SHADOW E&M-EST. PATIENT-LVL III: CPT | Mod: PBBFAC,,, | Performed by: ORTHOPAEDIC SURGERY

## 2023-07-26 RX ORDER — LIDOCAINE HYDROCHLORIDE 10 MG/ML
1 INJECTION, SOLUTION EPIDURAL; INFILTRATION; INTRACAUDAL; PERINEURAL ONCE
OUTPATIENT
Start: 2023-07-26 | End: 2023-07-26

## 2023-07-26 RX ORDER — MUPIROCIN 20 MG/G
OINTMENT TOPICAL
OUTPATIENT
Start: 2023-07-26

## 2023-07-26 NOTE — PATIENT INSTRUCTIONS
Surgery Instructions:     Your [facility] 07/27/23 [facility] [address] [facility], second entrance.    The pre-op department will be in contact with you prior to your procedure to review medications and instructions.       Nothing to eat or drink after midnight prior to day of surgery.    The surgery center will contact you the day prior to surgery to advise you of your arrival time for surgery.     Your post op appointment is scheduled on 08/11/23 @ 9:40am.

## 2023-07-26 NOTE — PROGRESS NOTES
7/26/2023    Chief Complaint:  Chief Complaint   Patient presents with    Left Wrist - Injury, Pain     7/16/23- fell while going up steps, fx left wrist        HPI:  Becki Flood is a 57 y.o. female, who presents to clinic today she has a history for fracture of her left wrist.  She had a fall on 07/16/2023.  She was noted have a fracture of the distal radius and placed into a splint.  She subsequently got the splint wet and took it off.  She had a another fall which cause worsening of the deformity of her wrist with increased pain.  She is here today for further evaluation and treatment options    PMHX:  Past Medical History:   Diagnosis Date    Anticoagulant long-term use     aspirin    Anxiety     Arthritis     Closed fracture of right distal radius 7/17/2018    GERD (gastroesophageal reflux disease)     Seizures     last one in 2018       PSHX:  Past Surgical History:   Procedure Laterality Date    APPENDECTOMY      FRACTURE SURGERY Left     foot surgery    LAPAROSCOPIC CHOLECYSTECTOMY N/A 9/2/2020    Procedure: CHOLECYSTECTOMY, LAPAROSCOPIC;  Surgeon: Tony Campo MD;  Location: [facility] OR;  Service: General;  Laterality: N/A;    OPEN REDUCTION AND INTERNAL FIXATION (ORIF) OF FRACTURE OF DISTAL RADIUS Right 7/18/2018    Procedure: ORIF, FRACTURE, RADIUS, DISTAL;  Surgeon: Asaf Ritchie MD;  Location: [facility] OR;  Service: Orthopedics;  Laterality: Right;  RIGHT DISTAL RADIUS    OVARIAN CYST REMOVAL         FMHX:  No family history on file.    SOCHX:  Social History     Tobacco Use    Smoking status: Every Day     Packs/day: 1.00     Years: 23.00     Pack years: 23.00     Types: Cigarettes    Smokeless tobacco: Never   Substance Use Topics    Alcohol use: No       ALLERGIES:  Patient has no known allergies.    CURRENT MEDICATIONS:  Current Outpatient Medications on File Prior to Visit   Medication Sig Dispense Refill    meloxicam (MOBIC) 15 MG tablet Take 15 mg by mouth daily as needed.      naproxen  "(NAPROSYN) 500 MG tablet Take 1 tablet (500 mg total) by mouth 2 (two) times daily as needed (pain). 20 tablet 0    ondansetron (ZOFRAN-ODT) 4 MG TbDL Take 1 tablet (4 mg total) by mouth every 6 (six) hours as needed. 20 tablet 0    pantoprazole (PROTONIX) 20 MG tablet Take 2 tablets (40 mg total) by mouth once daily. 30 tablet 11     No current facility-administered medications on file prior to visit.       REVIEW OF SYSTEMS:  Review of Systems   Constitutional: Negative.    HENT: Negative.     Eyes: Negative.    Respiratory: Negative.     Cardiovascular: Negative.    Gastrointestinal: Negative.    Genitourinary: Negative.    Musculoskeletal:  Positive for falls and joint pain.   Skin: Negative.    Neurological: Negative.    Endo/Heme/Allergies: Negative.    Psychiatric/Behavioral: Negative.       GENERAL PHYSICAL EXAM:   Ht 5' 4" (1.626 m)   Wt 55.8 kg (123 lb)   BMI 21.11 kg/m²    GEN: well developed, well nourished, no acute distress   HENT: Normocephalic, atraumatic   EYES: No discharge, conjunctiva normal   NECK: Supple, non-tender   PULM: No wheezing, no respiratory distress   CV: RRR   ABD: Soft, non-tender    ORTHO EXAM:   Examination of the left upper extremity reveals that there is an obvious deformity about the distal radius and wrist.  There are no open wounds.  Palpation about the elbow does not produce tenderness.  She does have tenderness over the distal radius and ulna.  She does report intact sensation in the median, radial, and ulnar distribution.  She has a 2+ radial pulse    RADIOLOGY:   X-rays of the left wrist were taken in clinic today.  She is noted have a comminuted extra-articular fracture of the distal radius.  There is 3-4 mm loss of height and 20° of dorsal angulation.  She also has an ulnar styloid base fracture.    ASSESSMENT:   Left distal radius and ulnar styloid fracture    PLAN:  1. Due to the comminution and displacement I have discussed the possibility of open reduction and " internal fixation.  After discussion of the risks and benefits of the procedure informed consent has been obtained    2.  Will proceed with open reduction internal fixation left distal radius fracture under general anesthesia with a single-shot supraclavicular block    3.  The patient will follow up with me 2 weeks postoperatively     4. We did place her into a volar plaster short-arm wrist splint for comfort.

## 2023-07-27 ENCOUNTER — HOSPITAL ENCOUNTER (OUTPATIENT)
Dept: RADIOLOGY | Facility: HOSPITAL | Age: 58
Discharge: HOME | End: 2023-07-27
Attending: ORTHOPAEDIC SURGERY | Admitting: ORTHOPAEDIC SURGERY

## 2023-07-27 DIAGNOSIS — M25.532 LEFT WRIST PAIN: ICD-10-CM

## 2023-08-01 ENCOUNTER — HOSPITAL ENCOUNTER (OUTPATIENT)
Dept: RADIOLOGY | Facility: HOSPITAL | Age: 58
Discharge: HOME | End: 2023-08-01
Attending: ORTHOPAEDIC SURGERY | Admitting: ORTHOPAEDIC SURGERY

## 2023-08-01 ENCOUNTER — TELEPHONE (OUTPATIENT)
Dept: SURGERY | Facility: HOSPITAL | Age: 58
End: 2023-08-01

## 2023-08-01 DIAGNOSIS — M25.532 LEFT WRIST PAIN: ICD-10-CM

## 2023-08-01 NOTE — TELEPHONE ENCOUNTER
"Patient did not show up for procedure today. No answer when called, left voicemail. Talked to Mother who states she does not know where patient is and has not heard from her "in quite some time". Mother of patient states she will "possibly do a welfare check". Dr. Everett aware. Please advise thank you!  "

## 2024-04-10 ENCOUNTER — OFFICE VISIT (OUTPATIENT)
Dept: PAIN MEDICINE | Facility: CLINIC | Age: 59
End: 2024-04-10
Payer: MEDICAID

## 2024-04-10 VITALS
BODY MASS INDEX: 21.13 KG/M2 | WEIGHT: 123.13 LBS | HEART RATE: 92 BPM | SYSTOLIC BLOOD PRESSURE: 142 MMHG | DIASTOLIC BLOOD PRESSURE: 92 MMHG

## 2024-04-10 DIAGNOSIS — S32.010A COMPRESSION FRACTURE OF L1 VERTEBRA, INITIAL ENCOUNTER: Primary | ICD-10-CM

## 2024-04-10 PROCEDURE — 99213 OFFICE O/P EST LOW 20 MIN: CPT | Mod: PBBFAC,PO | Performed by: PHYSICIAN ASSISTANT

## 2024-04-10 PROCEDURE — 99204 OFFICE O/P NEW MOD 45 MIN: CPT | Mod: S$PBB,,, | Performed by: PHYSICIAN ASSISTANT

## 2024-04-10 PROCEDURE — 99999 PR PBB SHADOW E&M-EST. PATIENT-LVL III: CPT | Mod: PBBFAC,,, | Performed by: PHYSICIAN ASSISTANT

## 2024-04-10 RX ORDER — TIZANIDINE 4 MG/1
4 TABLET ORAL NIGHTLY PRN
Qty: 30 TABLET | Refills: 0 | Status: SHIPPED | OUTPATIENT
Start: 2024-04-10

## 2024-04-10 RX ORDER — TRAMADOL HYDROCHLORIDE 50 MG/1
50 TABLET ORAL EVERY 8 HOURS PRN
Qty: 20 TABLET | Refills: 0 | Status: SHIPPED | OUTPATIENT
Start: 2024-04-10

## 2024-04-10 NOTE — PROGRESS NOTES
[facility] New Patient Evaluation      Referred by: Aaareferral Self    PCP:  Darnell Gray FNROSLYN    CC:   Chief Complaint   Patient presents with    Low-back Pain          HPI:   Becki Flood is a 58 y.o. year old female patient who has a past medical history of Anticoagulant long-term use, Anxiety, Arthritis, Closed fracture of right distal radius, GERD (gastroesophageal reflux disease), and Seizures. She presents in self referral for lower back pain.  On 3-30-24, a chicken spooked and attacked her.  She tripped over a root and fell.  She has felt lower back pain since then.  Pain is severe.  Pain is felt in the lower lumbar region.  She denies any radicular leg pain, numbness or tingling.  She was seen in the ER at [place], found to have an L1 compression fracture and prescribed a back brace, toradol, and medications for constipation.  She has nto started any medications yet due to cost.  She did not get the brace either.    Denies bowel/ bladder incontinence.    Past and current medications:  Antineuropathics:  NSAIDs:  Antidepressants:  Muscle relaxers:  Opioids:  Antiplatelets/Anticoagulants:    Physical Therapy/ Chiropractic care:  none    Pain Intervention History:  none    Past Spine Surgical History:  none        History:    Current Outpatient Medications:     ibuprofen (ADVIL,MOTRIN) 800 MG tablet, Take 1 tablet (800 mg total) by mouth every 8 (eight) hours as needed for Pain., Disp: 20 tablet, Rfl: 0    ondansetron (ZOFRAN-ODT) 4 MG TbDL, Take 1 tablet (4 mg total) by mouth every 6 (six) hours as needed., Disp: 20 tablet, Rfl: 0    pantoprazole (PROTONIX) 20 MG tablet, Take 2 tablets (40 mg total) by mouth once daily., Disp: 30 tablet, Rfl: 11    tiZANidine (ZANAFLEX) 4 MG tablet, Take 1 tablet (4 mg total) by mouth nightly as needed (muscle spasms/ pain)., Disp: 30 tablet, Rfl: 0    Past Medical History:   Diagnosis Date    Anticoagulant long-term use     aspirin    Anxiety     Arthritis   "   Closed fracture of right distal radius 7/17/2018    GERD (gastroesophageal reflux disease)     Seizures     last one in 2018       Past Surgical History:   Procedure Laterality Date    APPENDECTOMY      FRACTURE SURGERY Left     foot surgery    LAPAROSCOPIC CHOLECYSTECTOMY N/A 9/2/2020    Procedure: CHOLECYSTECTOMY, LAPAROSCOPIC;  Surgeon: Tony Campo MD;  Location: [facility] OR;  Service: General;  Laterality: N/A;    OPEN REDUCTION AND INTERNAL FIXATION (ORIF) OF FRACTURE OF DISTAL RADIUS Right 7/18/2018    Procedure: ORIF, FRACTURE, RADIUS, DISTAL;  Surgeon: Asaf Ritchie MD;  Location: [facility] OR;  Service: Orthopedics;  Laterality: Right;  RIGHT DISTAL RADIUS    OPEN REDUCTION AND INTERNAL FIXATION (ORIF) OF FRACTURE OF DISTAL RADIUS Left 8/1/2023    Procedure: ORIF, FRACTURE, RADIUS, DISTAL;  Surgeon: Jeff Everett MD;  Location: [facility] OR;  Service: Orthopedics;  Laterality: Left;  Procedure:  Open reduction internal fixation left distal radius fracture     Position:  Supine     Anesthesia: General with a single-shot supraclavicular block     Radiology:  Large C-arm     Equipment:  Basic can set, small bone set, Synthes distal radius set    OVARIAN CYST REMOVAL         History reviewed. No pertinent family history.    Social History     Socioeconomic History    Marital status:    Tobacco Use    Smoking status: Every Day     Current packs/day: 1.00     Average packs/day: 1 pack/day for 23.0 years (23.0 ttl pk-yrs)     Types: Cigarettes    Smokeless tobacco: Never   Substance and Sexual Activity    Alcohol use: No    Drug use: No    Sexual activity: Yes     Partners: Male       Review of patient's allergies indicates:  No Known Allergies    Labs:  No results found for: "LABA1C", "HGBA1C"    Lab Results   Component Value Date    WBC 6.72 06/03/2017    HGB 11.1 (L) 06/03/2017    HCT 33.0 (L) 06/03/2017    MCV 96 06/03/2017     06/03/2017           Review of Systems:  Lumbar back pain..  " Balance of review of systems is negative.    Physical Exam:  Vitals:    04/10/24 1339   BP: (!) 142/92   Pulse: 92   Weight: 55.8 kg (123 lb 2 oz)   PainSc:   7   PainLoc: Back     Body mass index is 21.13 kg/m².    Pain disability index:      4/10/2024     1:42 PM   Last 3 PDI Scores   Pain Disability Index (PDI) 42       Gen: NAD  Psych: mood appropriate for given condition  HEENT: eyes anicteric   CV: RRR, 2+ radial pulse  Respiratory: non-labored, no signs of respiratory distress  Abd: non-distended  Skin: warm, dry and intact.  Gait: Able to heel walk, toe walk. No antalgic gait.     Coordination:   Tandem walking coordination: normal    Cervical spine: ROM is full in flexion, extension and lateral rotation without increased pain.  Spurling's maneuver causes no neck pain to either side.  Myofascial exam: No Tenderness to palpation across cervical paraspinous region bilaterally.    Lumbar spine:  Lumbar spine: ROM is full with flexion extension and oblique extension with no increased pain.    Tommie's test causes no increased pain on either side.    Supine straight leg raise is negative bilaterally.    Internal and external rotation of the hip causes no increased pain on either side.  Myofascial exam: Tenderness to palpation across lumbar paraspinous muscles and midlune upper lumbar spine. No tenderness to palpation over the bilateral greater trochanters and bilateral SI joint    Sensory:  Intact and symmetrical to light touch in C4-T1 dermatomes bilaterally. Intact and symmetrical to light touch in L1-S1 dermatomes bilaterally.    Motor:    Right Left   C4 Shoulder Abduction  5  5   C5 Elbow Flexion    5  5   C6 Wrist Extension  5  5   C7 Elbow Extension   5  5   C8/T1 Hand Intrinsics   5  5        Right Left   L2/3 Iliacus Hip flexion  5  5   L3/4 Qudratus Femoris Knee Extension  5  5   L4/5 Tib Anterior Ankle Dorsiflexion   5  5   L5/S1 Extensor Hallicus Longus Great toe extension  5  5   S1/S2  Gastroc/Soleus Plantar Flexion  5  5      Right Left   Triceps DTR 2+ 2+   Biceps DTR 2+ 2+   Brachioradialis DTR 2+ 2+   Patellar DTR 2+ 2+   Achilles DTR 2+ 2+   Joiner Absent  Absent   Clonus Absent Absent   Babinski Absent Absent       Imaging:  CT lumbar spine from [place] 4-3-24:  L1 anterior wedge compresison fracture.  No significant osseous foraminal or central canal narrowing at any level.      Assessment:   Becki Flood is a 58 y.o. year old female patient who has a past medical history of Anticoagulant long-term use, Anxiety, Arthritis, Closed fracture of right distal radius, GERD (gastroesophageal reflux disease), and Seizures. She presents in self referral for lower back pain due to L1 compression fracutre.    Plan:  - to best help with pain, recommend LSO brace and medications.  - re-assured her pain from the fracture will slowly heal over 4-6 weeks time.  - I placed ordered for LSO brace, tizanidine and will discuss norco with Dr. Sal for the fracture pain.  She will check into cost and hopefully be able to receive them.  My nurse has also been so kind to help her complete financial assistance forms and medicaid forms to help with cost to get the treatment she needs.  - LSO brace; Brace will be custom fit/ adjusted to ensure adequate support for patient anatomy.; to be worn when upright, walking, standing.  Remove for showering and laying flat.  - no lifting greater than 10 pounds.  - follow up in 2 weeks with xrays of the lumbar spine.    Problem List Items Addressed This Visit    None  Visit Diagnoses       Compression fracture of L1 vertebra, initial encounter    -  Primary    Relevant Medications    tiZANidine (ZANAFLEX) 4 MG tablet    Other Relevant Orders    Back/Cervical Brace For Home Use    X-Ray Lumbar Spine Ap And Lateral            Follow Up: RTC in 2 weeks with xrays of the lumbar spine    : Reviewed and consistent with medication use as  prescribed.          Elizabeth Thacker PA-C  [facility]

## 2024-11-19 NOTE — TELEPHONE ENCOUNTER
----- Message from Mary Brown sent at 7/31/2018  4:05 PM CDT -----  Contact: Sinan Zaks (Son) [phone]  Sinan Sheldon (Son) [phone]  Please call in regards to rescheduling his mother's appt that was for 7/31/18   Detail Level: Detailed

## 2025-07-14 ENCOUNTER — TELEPHONE (OUTPATIENT)
Dept: SMOKING CESSATION | Facility: CLINIC | Age: 60
End: 2025-07-14
Payer: MEDICAID

## (undated) DEVICE — SUT MONOCRYL 2-0 S UND

## (undated) DEVICE — SEE MEDLINE ITEM 153151

## (undated) DEVICE — PAD CAST SPECIALIST STRL 4

## (undated) DEVICE — DURAPREP SURG SCRUB 26ML

## (undated) DEVICE — PAD ABD 8X10 STERILE

## (undated) DEVICE — ALCOHOL 70% ISOP RUBBING 4OZ

## (undated) DEVICE — SEE MEDLINE ITEM 146231

## (undated) DEVICE — SEE MEDLINE ITEM 157131

## (undated) DEVICE — SPLINT PLASTER FAST SET 5X30IN

## (undated) DEVICE — PADDING CAST SPECIALIST 6X4YD

## (undated) DEVICE — SOL 9P NACL IRR PIC IL

## (undated) DEVICE — PAD CAST SPECIALIST STRL 6

## (undated) DEVICE — GLOVE PROTEXIS HYDROGEL SZ8.5

## (undated) DEVICE — SLING ORTHOPEDIC LARGE

## (undated) DEVICE — Device

## (undated) DEVICE — GAUZE SPONGE 8X4 12 PLY

## (undated) DEVICE — DRESSING XEROFORM FOIL PK 1X8

## (undated) DEVICE — DRAPE STERI U-SHAPED 47X51IN

## (undated) DEVICE — SEE MEDLINE ITEM 152530

## (undated) DEVICE — ELECTRODE REM PLYHSV RETURN 9

## (undated) DEVICE — BIT DRILL 2.0.

## (undated) DEVICE — SLEEVE SCD EXPRESS CALF MEDIUM

## (undated) DEVICE — BANDAGE ESMARK LATEX FREE 4INX

## (undated) DEVICE — PACK ARTHROSCOPY W/ISO BAC

## (undated) DEVICE — BIT DRILL QC 1.8X110MM

## (undated) DEVICE — SEE MEDLINE ITEM 146308

## (undated) DEVICE — SEE MEDLINE ITEM 157117

## (undated) DEVICE — SEE MEDLINE ITEM 146313

## (undated) DEVICE — DRAPE C-ARM FOR MOBILE XRAY

## (undated) DEVICE — SUT MONOCRYL 3-0 SH U/D

## (undated) DEVICE — PADDING CAST 4IN SPECIALIST

## (undated) DEVICE — SUT PDS II O CT-2 VIL MONO

## (undated) DEVICE — GLOVE SURGEONS ULTRA TOUCH 6.5

## (undated) DEVICE — UNDERGLOVE BIOGEL PI SZ 6.5 LF